# Patient Record
Sex: FEMALE | Race: WHITE | URBAN - METROPOLITAN AREA
[De-identification: names, ages, dates, MRNs, and addresses within clinical notes are randomized per-mention and may not be internally consistent; named-entity substitution may affect disease eponyms.]

---

## 2021-09-24 ENCOUNTER — OFFICE VISIT (OUTPATIENT)
Dept: URBAN - METROPOLITAN AREA CLINIC 68 | Facility: CLINIC | Age: 71
End: 2021-09-24
Payer: MEDICARE

## 2021-09-24 DIAGNOSIS — Z96.1 PRESENCE OF INTRAOCULAR LENS: ICD-10-CM

## 2021-09-24 DIAGNOSIS — G45.3 AMAUROSIS FUGAX: ICD-10-CM

## 2021-09-24 DIAGNOSIS — H33.8 OTHER RETINAL DETACHMENTS: Primary | ICD-10-CM

## 2021-09-24 PROCEDURE — 99214 OFFICE O/P EST MOD 30 MIN: CPT | Performed by: OPHTHALMOLOGY

## 2021-09-24 PROCEDURE — 92134 CPTRZ OPH DX IMG PST SGM RTA: CPT | Performed by: OPHTHALMOLOGY

## 2021-09-24 ASSESSMENT — INTRAOCULAR PRESSURE
OD: 18
OS: 17

## 2021-09-24 NOTE — IMPRESSION/PLAN
Impression: Amaurosis fugax: G45.3. Plan: Episode in 2020: Completely brown  in the left eye. Had a migraine within 24 hours. No recurrent episodes. Monitor.

## 2021-09-24 NOTE — IMPRESSION/PLAN
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 OD.
s/p pneumatic with cryo
s/p PPV/EL OU (OS for PVD) OCT OU  = no SRF/IRF OU  & 268 Plan: Has had occasional floaters and lights OS. Stable. No RD/RT/CME/ERM/MH.
RDW. Reassured. 

12m OCT OU

## 2023-11-09 ENCOUNTER — OFFICE VISIT (OUTPATIENT)
Facility: LOCATION | Age: 73
End: 2023-11-09
Payer: MEDICARE

## 2023-11-09 DIAGNOSIS — H33.8 OTHER RETINAL DETACHMENTS: Primary | ICD-10-CM

## 2023-11-09 DIAGNOSIS — Z96.1 PRESENCE OF INTRAOCULAR LENS: ICD-10-CM

## 2023-11-09 DIAGNOSIS — G45.3 AMAUROSIS FUGAX: ICD-10-CM

## 2023-11-09 PROCEDURE — 99212 OFFICE O/P EST SF 10 MIN: CPT | Performed by: OPHTHALMOLOGY

## 2023-11-09 PROCEDURE — 92134 CPTRZ OPH DX IMG PST SGM RTA: CPT | Performed by: OPHTHALMOLOGY

## 2023-11-09 RX ORDER — MV-MIN/FOLIC/VIT K/LYCOP/COQ10 200-100MCG
75 MG CAPSULE ORAL
Qty: 0 | Refills: 0 | Status: ACTIVE
Start: 2023-11-09

## 2023-11-09 RX ORDER — MAGNESIUM 250 MG
250 MG TABLET ORAL
Qty: 0 | Refills: 0 | Status: ACTIVE
Start: 2023-11-09

## 2023-11-09 RX ORDER — BUTYROSPERMUM PARKII(SHEA BUTTER), SIMMONDSIA CHINENSIS (JOJOBA) SEED OIL, ALOE BARBADENSIS LEAF EXTRACT .01; 1; 3.5 G/100G; G/100G; G/100G
LIQUID TOPICAL
Qty: 0 | Refills: 0 | Status: ACTIVE
Start: 2023-11-09

## 2023-11-09 RX ORDER — DIAZEPAM 5 MG/ML
SOLUTION, CONCENTRATE ORAL
Qty: 0 | Refills: 0 | Status: ACTIVE
Start: 2023-11-09

## 2023-11-09 RX ORDER — LEVOTHYROXINE SODIUM 112 UG/1
TABLET ORAL
Qty: 0 | Refills: 0 | Status: ACTIVE
Start: 2023-11-09

## 2023-11-09 RX ORDER — AMOXICILLIN 250 MG
CAPSULE ORAL
Qty: 0 | Refills: 0 | Status: ACTIVE
Start: 2023-11-09

## 2023-11-09 RX ORDER — FLECAINIDE ACETATE 150 MG/1
150 MG TABLET ORAL
Qty: 0 | Refills: 0 | Status: ACTIVE
Start: 2023-11-09

## 2023-11-09 RX ORDER — SUMATRIPTAN SUCCINATE 50 MG/1
50 MG TABLET, FILM COATED ORAL
Qty: 0 | Refills: 0 | Status: ACTIVE
Start: 2023-11-09

## 2023-11-09 RX ORDER — ACETAMINOPHEN AND DIPHENHYDRAMINE HYDROCHLORIDE 500; 25 MG/1; MG/1
TABLET, FILM COATED ORAL
Qty: 0 | Refills: 0 | Status: ACTIVE
Start: 2023-11-09

## 2023-11-09 ASSESSMENT — INTRAOCULAR PRESSURE
OS: 16
OD: 15